# Patient Record
Sex: FEMALE | Race: BLACK OR AFRICAN AMERICAN | NOT HISPANIC OR LATINO | URBAN - METROPOLITAN AREA
[De-identification: names, ages, dates, MRNs, and addresses within clinical notes are randomized per-mention and may not be internally consistent; named-entity substitution may affect disease eponyms.]

---

## 2024-02-01 ENCOUNTER — PATIENT OUTREACH (OUTPATIENT)
Dept: CARE COORDINATION | Facility: CLINIC | Age: 29
End: 2024-02-01

## 2024-02-01 NOTE — PROGRESS NOTES
Outreach call to patient to support a smooth transition of care from recent admission.  Left voicemail message for patient with my contact information [phone].  Modesta Gamboa RN

## 2024-02-15 NOTE — PROGRESS NOTES
4/20/2023      Bina Nogueira  [id number]  [place] [address]      Dear Rianna Orta Alexander M, MD wrote a referral for you to see a Behavioral Health provider. Our attempt to reach you by phone has been unsuccessful.     Please call the [facility] Scheduling Patient Line [phone] at your earliest convenience. Let the  know that a behavioral health referral has been ordered and you are calling to discuss the referral.    We look forward to assisting you with your health care needs.    Sincerely,    [facility] Scheduling Department  [phone]       Follow-up task: schedule follow-up visit within 7-14 days after discharged.    Outreach call to patient to support a smooth transition of care from recent admission.  Left voicemail message for patient with my contact information [phone].    Modesta Gamboa RN